# Patient Record
Sex: MALE | Race: WHITE | Employment: OTHER | URBAN - NONMETROPOLITAN AREA
[De-identification: names, ages, dates, MRNs, and addresses within clinical notes are randomized per-mention and may not be internally consistent; named-entity substitution may affect disease eponyms.]

---

## 2018-02-01 ENCOUNTER — TELEPHONE (OUTPATIENT)
Dept: FAMILY MEDICINE CLINIC | Age: 58
End: 2018-02-01

## 2018-02-01 DIAGNOSIS — E78.5 HYPERLIPIDEMIA, UNSPECIFIED HYPERLIPIDEMIA TYPE: Primary | ICD-10-CM

## 2018-02-01 NOTE — TELEPHONE ENCOUNTER
Patients wife called back and asked if you would order the labs requested by the eye  To be done before they schedule an appointment with you to review them.

## 2018-02-12 LAB
ALBUMIN SERPL-MCNC: NORMAL G/DL
ALBUMIN SERPL-MCNC: NORMAL G/DL
ALP BLD-CCNC: NORMAL U/L
ALP BLD-CCNC: NORMAL U/L
ALT SERPL-CCNC: NORMAL U/L
ALT SERPL-CCNC: NORMAL U/L
ANION GAP SERPL CALCULATED.3IONS-SCNC: NORMAL MMOL/L
ANION GAP SERPL CALCULATED.3IONS-SCNC: NORMAL MMOL/L
AST SERPL-CCNC: NORMAL U/L
AST SERPL-CCNC: NORMAL U/L
BILIRUB SERPL-MCNC: NORMAL MG/DL (ref 0.1–1.4)
BILIRUB SERPL-MCNC: NORMAL MG/DL (ref 0.1–1.4)
BUN BLDV-MCNC: NORMAL MG/DL
BUN BLDV-MCNC: NORMAL MG/DL
CALCIUM SERPL-MCNC: 9.4 MG/DL
CALCIUM SERPL-MCNC: NORMAL MG/DL
CHLORIDE BLD-SCNC: NORMAL MMOL/L
CHLORIDE BLD-SCNC: NORMAL MMOL/L
CHOLESTEROL, TOTAL: 221 MG/DL
CHOLESTEROL, TOTAL: 221 MG/DL
CHOLESTEROL/HDL RATIO: NORMAL
CHOLESTEROL/HDL RATIO: NORMAL
CO2: NORMAL MMOL/L
CO2: NORMAL MMOL/L
CREAT SERPL-MCNC: 0.82 MG/DL
CREAT SERPL-MCNC: 0.82 MG/DL
GFR CALCULATED: NORMAL
GFR CALCULATED: NORMAL
GLUCOSE BLD-MCNC: 100 MG/DL
GLUCOSE BLD-MCNC: NORMAL MG/DL
HDLC SERPL-MCNC: 53 MG/DL (ref 35–70)
HDLC SERPL-MCNC: 53 MG/DL (ref 35–70)
LDL CHOLESTEROL CALCULATED: 159 MG/DL (ref 0–160)
LDL CHOLESTEROL CALCULATED: 159 MG/DL (ref 0–160)
POTASSIUM SERPL-SCNC: 5.2 MMOL/L
POTASSIUM SERPL-SCNC: 5.2 MMOL/L
SODIUM BLD-SCNC: 141 MMOL/L
SODIUM BLD-SCNC: NORMAL MMOL/L
TOTAL PROTEIN: NORMAL
TOTAL PROTEIN: NORMAL
TRIGL SERPL-MCNC: 45 MG/DL
TRIGL SERPL-MCNC: 45 MG/DL
VLDLC SERPL CALC-MCNC: 9 MG/DL
VLDLC SERPL CALC-MCNC: 9 MG/DL

## 2018-02-13 ENCOUNTER — OFFICE VISIT (OUTPATIENT)
Dept: FAMILY MEDICINE CLINIC | Age: 58
End: 2018-02-13
Payer: COMMERCIAL

## 2018-02-13 VITALS
BODY MASS INDEX: 33.21 KG/M2 | DIASTOLIC BLOOD PRESSURE: 78 MMHG | HEIGHT: 67 IN | RESPIRATION RATE: 18 BRPM | SYSTOLIC BLOOD PRESSURE: 130 MMHG | WEIGHT: 211.6 LBS | OXYGEN SATURATION: 97 % | HEART RATE: 68 BPM

## 2018-02-13 DIAGNOSIS — E78.00 PURE HYPERCHOLESTEROLEMIA: ICD-10-CM

## 2018-02-13 DIAGNOSIS — Z23 NEED FOR PNEUMOCOCCAL VACCINATION: ICD-10-CM

## 2018-02-13 DIAGNOSIS — H34.219 HOLLENHORST PLAQUE: Primary | ICD-10-CM

## 2018-02-13 PROCEDURE — 99214 OFFICE O/P EST MOD 30 MIN: CPT | Performed by: NURSE PRACTITIONER

## 2018-02-13 PROCEDURE — 90732 PPSV23 VACC 2 YRS+ SUBQ/IM: CPT | Performed by: NURSE PRACTITIONER

## 2018-02-13 PROCEDURE — G8484 FLU IMMUNIZE NO ADMIN: HCPCS | Performed by: NURSE PRACTITIONER

## 2018-02-13 PROCEDURE — 90471 IMMUNIZATION ADMIN: CPT | Performed by: NURSE PRACTITIONER

## 2018-02-13 PROCEDURE — 4004F PT TOBACCO SCREEN RCVD TLK: CPT | Performed by: NURSE PRACTITIONER

## 2018-02-13 PROCEDURE — 3017F COLORECTAL CA SCREEN DOC REV: CPT | Performed by: NURSE PRACTITIONER

## 2018-02-13 PROCEDURE — G8417 CALC BMI ABV UP PARAM F/U: HCPCS | Performed by: NURSE PRACTITIONER

## 2018-02-13 PROCEDURE — G8427 DOCREV CUR MEDS BY ELIG CLIN: HCPCS | Performed by: NURSE PRACTITIONER

## 2018-02-13 RX ORDER — BUPROPION HYDROCHLORIDE 150 MG/1
150 TABLET ORAL EVERY MORNING
Qty: 30 TABLET | Refills: 3 | Status: SHIPPED | OUTPATIENT
Start: 2018-02-13 | End: 2022-08-16

## 2018-02-13 RX ORDER — ATORVASTATIN CALCIUM 10 MG/1
10 TABLET, FILM COATED ORAL DAILY
Qty: 90 TABLET | Refills: 3 | Status: SHIPPED | OUTPATIENT
Start: 2018-02-13 | End: 2022-04-12

## 2018-02-13 ASSESSMENT — ENCOUNTER SYMPTOMS
GASTROINTESTINAL NEGATIVE: 1
RESPIRATORY NEGATIVE: 1
COLOR CHANGE: 1
EYES NEGATIVE: 1

## 2018-02-13 NOTE — PROGRESS NOTES
Subjective:      Patient ID: Joshua Morgan is a 62 y.o. male. HPI   Chief Complaint   Patient presents with   [address] Other     spot on outside of left foot     Patient's medications, allergies, past medical, surgical, social and family histories were reviewed and updated as appropriate. There is no problem list on file for this patient. Allergies   Allergen Reactions    Penicillins Shortness Of Breath, Swelling and Rash    Statins      Health Maintenance   Topic Date Due    Hepatitis C screen  1960    HIV screen  10/28/1975    DTaP/Tdap/Td vaccine (1 - Tdap) 10/28/1979    Diabetes screen  10/28/2000    Flu vaccine (1) 09/01/2017    Lipid screen  07/15/2021    Colon cancer screen colonoscopy  07/13/2025    Pneumococcal med risk  Completed     Current Outpatient Prescriptions   Medication Sig Dispense Refill    atorvastatin (LIPITOR) 10 MG tablet Take 1 tablet by mouth daily 90 tablet 3    buPROPion (WELLBUTRIN XL) 150 MG extended release tablet Take 1 tablet by mouth every morning 30 tablet 3    UNABLE TO FIND Indications: LipiControl       No current facility-administered medications for this visit. Pt here for check. Reviewed recent note from optometry about his cholesterol  Pt has previouis tried lipitor and said it made him achy but states will try again. He did have a traumatic injury to eye before which may explain the finding. He laos has a lesion on his nose he wants checked and his foot     Review of Systems   Constitutional: Negative. HENT: Negative. Eyes: Negative. Respiratory: Negative. Cardiovascular: Negative. Gastrointestinal: Negative. Musculoskeletal: Negative. Skin: Positive for color change. Neurological: Negative. Objective:   Physical Exam   Constitutional: He is oriented to person, place, and time. He appears well-developed and well-nourished. HENT:   Head: Normocephalic.        Right Ear: Tympanic

## 2022-03-29 ENCOUNTER — OFFICE VISIT (OUTPATIENT)
Dept: FAMILY MEDICINE CLINIC | Age: 62
End: 2022-03-29
Payer: COMMERCIAL

## 2022-03-29 VITALS
DIASTOLIC BLOOD PRESSURE: 89 MMHG | RESPIRATION RATE: 17 BRPM | HEIGHT: 67 IN | TEMPERATURE: 96.9 F | BODY MASS INDEX: 34.53 KG/M2 | SYSTOLIC BLOOD PRESSURE: 152 MMHG | OXYGEN SATURATION: 94 % | HEART RATE: 74 BPM | WEIGHT: 220 LBS

## 2022-03-29 DIAGNOSIS — L03.811 CELLULITIS OF HEAD OR SCALP: ICD-10-CM

## 2022-03-29 DIAGNOSIS — Z12.5 SCREENING FOR PROSTATE CANCER: ICD-10-CM

## 2022-03-29 DIAGNOSIS — E78.5 HYPERLIPIDEMIA, UNSPECIFIED HYPERLIPIDEMIA TYPE: ICD-10-CM

## 2022-03-29 DIAGNOSIS — R03.0 ELEVATED BP WITHOUT DIAGNOSIS OF HYPERTENSION: Primary | ICD-10-CM

## 2022-03-29 PROCEDURE — 3017F COLORECTAL CA SCREEN DOC REV: CPT | Performed by: NURSE PRACTITIONER

## 2022-03-29 PROCEDURE — G8484 FLU IMMUNIZE NO ADMIN: HCPCS | Performed by: NURSE PRACTITIONER

## 2022-03-29 PROCEDURE — 1036F TOBACCO NON-USER: CPT | Performed by: NURSE PRACTITIONER

## 2022-03-29 PROCEDURE — G8419 CALC BMI OUT NRM PARAM NOF/U: HCPCS | Performed by: NURSE PRACTITIONER

## 2022-03-29 PROCEDURE — 99214 OFFICE O/P EST MOD 30 MIN: CPT | Performed by: NURSE PRACTITIONER

## 2022-03-29 PROCEDURE — G8427 DOCREV CUR MEDS BY ELIG CLIN: HCPCS | Performed by: NURSE PRACTITIONER

## 2022-03-29 RX ORDER — DOXYCYCLINE HYCLATE 100 MG
100 TABLET ORAL 2 TIMES DAILY
Qty: 28 TABLET | Refills: 0 | Status: SHIPPED | OUTPATIENT
Start: 2022-03-29 | End: 2022-04-12

## 2022-03-29 RX ORDER — KETOCONAZOLE 20 MG/G
CREAM TOPICAL
Qty: 30 G | Refills: 1 | Status: SHIPPED | OUTPATIENT
Start: 2022-03-29

## 2022-03-29 SDOH — ECONOMIC STABILITY: FOOD INSECURITY: WITHIN THE PAST 12 MONTHS, THE FOOD YOU BOUGHT JUST DIDN'T LAST AND YOU DIDN'T HAVE MONEY TO GET MORE.: NEVER TRUE

## 2022-03-29 SDOH — ECONOMIC STABILITY: FOOD INSECURITY: WITHIN THE PAST 12 MONTHS, YOU WORRIED THAT YOUR FOOD WOULD RUN OUT BEFORE YOU GOT MONEY TO BUY MORE.: NEVER TRUE

## 2022-03-29 ASSESSMENT — PATIENT HEALTH QUESTIONNAIRE - PHQ9
SUM OF ALL RESPONSES TO PHQ QUESTIONS 1-9: 0
SUM OF ALL RESPONSES TO PHQ QUESTIONS 1-9: 0
2. FEELING DOWN, DEPRESSED OR HOPELESS: 0
SUM OF ALL RESPONSES TO PHQ9 QUESTIONS 1 & 2: 0
1. LITTLE INTEREST OR PLEASURE IN DOING THINGS: 0
SUM OF ALL RESPONSES TO PHQ QUESTIONS 1-9: 0
SUM OF ALL RESPONSES TO PHQ QUESTIONS 1-9: 0

## 2022-03-29 ASSESSMENT — ENCOUNTER SYMPTOMS
GASTROINTESTINAL NEGATIVE: 1
RESPIRATORY NEGATIVE: 1
EYES NEGATIVE: 1

## 2022-03-29 ASSESSMENT — SOCIAL DETERMINANTS OF HEALTH (SDOH): HOW HARD IS IT FOR YOU TO PAY FOR THE VERY BASICS LIKE FOOD, HOUSING, MEDICAL CARE, AND HEATING?: NOT HARD AT ALL

## 2022-03-29 NOTE — PROGRESS NOTES
Adriana Milner is a 64 y.o. male whopresents today for :  Chief Complaint   Patient presents with    Rash     on forehead spreading        HPI:     HPI  Started 3 weeks ago with a \"boil\" on forehead, which has then has slowly spread in the area. There is no problem list on file for this patient. Past Medical History:   Diagnosis Date    Hearing loss       Past Surgical History:   Procedure Laterality Date    HERNIA REPAIR      TONSILLECTOMY Bilateral      Family History   Problem Relation Age of Onset    High Blood Pressure Mother     Cancer Brother     Heart Disease Maternal Grandmother     Other Maternal Grandfather         blood clot     Social History     Tobacco Use    Smoking status: Former Smoker     Packs/day: 1.00     Years: 10.00     Pack years: 10.00     Types: Cigars, Cigarettes     Quit date: 2007     Years since quitting: 15.2    Smokeless tobacco: Never Used   Substance Use Topics    Alcohol use: Yes     Alcohol/week: 12.0 standard drinks     Types: 12 Cans of beer per week      Current Outpatient Medications   Medication Sig Dispense Refill    doxycycline hyclate (VIBRA-TABS) 100 MG tablet Take 1 tablet by mouth 2 times daily for 14 days 28 tablet 0    ketoconazole (NIZORAL) 2 % cream Apply topically daily. 30 g 1    atorvastatin (LIPITOR) 10 MG tablet Take 1 tablet by mouth daily (Patient not taking: Reported on 3/29/2022) 90 tablet 3    buPROPion (WELLBUTRIN XL) 150 MG extended release tablet Take 1 tablet by mouth every morning (Patient not taking: Reported on 3/29/2022) 30 tablet 3    UNABLE TO FIND Indications: LipiControl (Patient not taking: Reported on 3/29/2022)       No current facility-administered medications for this visit.      Allergies   Allergen Reactions    Penicillins Shortness Of Breath, Swelling and Rash    Statins      Health Maintenance   Topic Date Due    COVID-19 Vaccine (1) Never done    DTaP/Tdap/Td vaccine (1 - Tdap) Never done    Diabetes screen  Never done    Shingles Vaccine (1 of 2) Never done    Lipid screen  02/12/2019    Flu vaccine (1) Never done    Depression Screen  03/29/2023    Colorectal Cancer Screen  07/13/2025    Hepatitis A vaccine  Aged Out    Hepatitis B vaccine  Aged Out    Hib vaccine  Aged Out    Meningococcal (ACWY) vaccine  Aged Out    Pneumococcal 0-64 years Vaccine  Aged Out    Hepatitis C screen  Discontinued    HIV screen  Discontinued       Subjective:     Review of Systems   Constitutional: Negative. HENT: Negative. Eyes: Negative. Respiratory: Negative. Cardiovascular: Negative. Gastrointestinal: Negative. Musculoskeletal: Negative. Skin: Positive for wound. Neurological: Negative. Objective:     Vitals:    03/29/22 0950   BP: (!) 152/89   Site: Left Upper Arm   Position: Sitting   Cuff Size: Large Adult   Pulse: 74   Resp: 17   Temp: 96.9 °F (36.1 °C)   TempSrc: Temporal   SpO2: 94%   Weight: 220 lb (99.8 kg)   Height: 5' 7\" (1.702 m)       Physical Exam  Constitutional:       Appearance: He is well-developed. HENT:      Head: Normocephalic. Right Ear: Tympanic membrane and external ear normal.      Left Ear: Tympanic membrane and external ear normal.      Nose: Nose normal.   Cardiovascular:      Rate and Rhythm: Normal rate and regular rhythm. Heart sounds: Normal heart sounds. No murmur heard. No friction rub. No gallop. Pulmonary:      Effort: Pulmonary effort is normal.      Breath sounds: Normal breath sounds. No wheezing or rales. Abdominal:      General: Bowel sounds are normal.      Palpations: Abdomen is soft. Tenderness: There is no abdominal tenderness. There is no guarding. Musculoskeletal:         General: Normal range of motion. Cervical back: Normal range of motion and neck supple. Lymphadenopathy:      Cervical: No cervical adenopathy. Skin:     General: Skin is warm.    Neurological:      Mental Status: He is alert and oriented to person, place, and time. Deep Tendon Reflexes: Reflexes are normal and symmetric. Assessment:      Diagnosis Orders   1. Elevated BP without diagnosis of hypertension  CBC with Auto Differential    Comprehensive Metabolic Panel    Lipid Panel   2. Cellulitis of head or scalp     3. Screening for prostate cancer  PSA Screening   4. Hyperlipidemia, unspecified hyperlipidemia type  CBC with Auto Differential    Comprehensive Metabolic Panel    Lipid Panel       Plan:      No follow-ups on file. Orders Placed This Encounter   Procedures    CBC with Auto Differential     Standing Status:   Future     Standing Expiration Date:   3/29/2023    Comprehensive Metabolic Panel     Standing Status:   Future     Standing Expiration Date:   3/29/2023    Lipid Panel     Standing Status:   Future     Standing Expiration Date:   3/29/2023     Order Specific Question:   Is Patient Fasting?/# of Hours     Answer:   15    PSA Screening     Standing Status:   Future     Standing Expiration Date:   3/29/2023     Orders Placed This Encounter   Medications    doxycycline hyclate (VIBRA-TABS) 100 MG tablet     Sig: Take 1 tablet by mouth 2 times daily for 14 days     Dispense:  28 tablet     Refill:  0    ketoconazole (NIZORAL) 2 % cream     Sig: Apply topically daily. Dispense:  30 g     Refill:  1      See orders  bp was elevated  Advised to monitor  Get labs done  Discussed vaccination    Patient given educational materials - seepatient instructions. Discussed use, benefit, and side effects of prescribed medications. All patient questions answered. Pt voiced understanding. Patient agreed withtreatment plan. Follow up as directed.      Electronically signed by VEENA Elizabeth CNP on 3/29/2022 at 4:21 PM

## 2022-03-30 ENCOUNTER — NURSE TRIAGE (OUTPATIENT)
Dept: OTHER | Facility: CLINIC | Age: 62
End: 2022-03-30

## 2022-03-30 ENCOUNTER — TELEPHONE (OUTPATIENT)
Dept: FAMILY MEDICINE CLINIC | Age: 62
End: 2022-03-30

## 2022-03-30 RX ORDER — LISINOPRIL AND HYDROCHLOROTHIAZIDE 12.5; 1 MG/1; MG/1
1 TABLET ORAL DAILY
Qty: 90 TABLET | Refills: 1 | Status: SHIPPED | OUTPATIENT
Start: 2022-03-30 | End: 2022-08-16

## 2022-03-30 NOTE — TELEPHONE ENCOUNTER
Pt and wife called back stating pt went to [facility] to check bp. Pt checked BP x 3 and was 219/111, 213/120 and 222/120.     Pt will start Prinzide, continue to monitor BP's at home and will call the office back if further concerns

## 2022-03-30 NOTE — TELEPHONE ENCOUNTER
Call tomorrow with how it is doing. I am hoping the reading today was an isolated event as it was high yesterday but nowhere near this high. If we get to readings like yesterday ok to drive.   With current readings we should be cautious

## 2022-03-30 NOTE — TELEPHONE ENCOUNTER
Pt's wife Darnell Vigil called concerned with pt's BP this am. Wife stated when pt was here yesterday for appt it was slightly elevated but did not feel concerned. Pt checked BP three times this morning. Reading's were 191/105, 201/111 and 212/111. Pt told wife he was not having any symptoms and it was probably just the machine. Wife checked her BP to see if it would read high and her BP reading was 122/85. Wife instructed pt to stop by [facility] or [facility] on way to work and check BP with one of the machines at the pharmacy. Advised wife I would send msg on to provider but if pt is having any BP readings that high with chest pain or any symptoms to report to ER.

## 2022-03-30 NOTE — TELEPHONE ENCOUNTER
Received call from [address] at [facility] with Red Flag Complaint. Subjective: Caller states \"we were think pressure cuff at home isn't accurate. He went to [facility] 222/120 and few minutes after rest 219/111, 213/120\"     Current Symptoms: pt reports feels fine. Patient denies chest pain , shortness of breath, difficulty talking or walking, blurry vision, dizziness, headache.    - this writer did see notes about BP with office and plan that provider sent in [facility]. Pt is not aware of this. Onset: today    Associated Symptoms: NA    Pain Severity: no pain     Temperature: no fever    What has been tried: rechecking blood [facility] at [facility]. LMP: NA Pregnant: NA    Recommended disposition: Go to [facility]    Care advice provided, patient verbalizes understanding; denies any other questions or concerns; instructed to call back for any new or worsening symptoms. Patient/Caller agrees with recommended disposition; writer provided warm transfer to channing at [facility] for appointment scheduling     Attention Provider: Thank you for allowing me to participate in the care of your patient. The patient was connected to triage in response to information provided to the ECC/PSC. Please do not respond through this encounter as the response is not directed to a shared pool.     Reason for Disposition   Systolic BP >= 787 OR Diastolic >= 640 and having NO cardiac or neurologic symptoms    Protocols used: BLOOD PRESSURE - HIGH-ADULT-OH

## 2022-03-30 NOTE — TELEPHONE ENCOUNTER
Spoke with patient's wife. Patient scheduled for appt 4/12/22. Patient's wife asking if it is ok for patient to be driving wit his BP running this high.   Patient is asymptomatic

## 2022-03-31 NOTE — TELEPHONE ENCOUNTER
Wife called back with BP readings:    3/30/22: evening 183/107. Wife states she waiting about 2 hours after patient took BP medication to recheck. BP at 10:30pm 164/88    3/31/22: AM BP: 168/97 then 168/97 again at 8:30am.  Patient had not taken BP medication.   Rechecked blood pressure after taking medication 162/96

## 2022-03-31 NOTE — TELEPHONE ENCOUNTER
Spoke with pt, he states he is feeling fine and his BP last evening was 180/88 and this am is was even lower.   He is not at home and his wife has the written down at home    Spoke with wife Edward Menchaca, she is not home she will call back this afternoon with BP readings

## 2022-04-11 ENCOUNTER — NURSE ONLY (OUTPATIENT)
Dept: FAMILY MEDICINE CLINIC | Age: 62
End: 2022-04-11
Payer: COMMERCIAL

## 2022-04-11 DIAGNOSIS — E78.5 HYPERLIPIDEMIA, UNSPECIFIED HYPERLIPIDEMIA TYPE: ICD-10-CM

## 2022-04-11 DIAGNOSIS — Z12.5 SCREENING FOR PROSTATE CANCER: ICD-10-CM

## 2022-04-11 DIAGNOSIS — R03.0 ELEVATED BP WITHOUT DIAGNOSIS OF HYPERTENSION: ICD-10-CM

## 2022-04-11 PROCEDURE — 36415 COLL VENOUS BLD VENIPUNCTURE: CPT | Performed by: NURSE PRACTITIONER

## 2022-04-12 ENCOUNTER — OFFICE VISIT (OUTPATIENT)
Dept: FAMILY MEDICINE CLINIC | Age: 62
End: 2022-04-12
Payer: COMMERCIAL

## 2022-04-12 VITALS
HEART RATE: 67 BPM | RESPIRATION RATE: 16 BRPM | SYSTOLIC BLOOD PRESSURE: 152 MMHG | OXYGEN SATURATION: 98 % | WEIGHT: 216 LBS | TEMPERATURE: 98.6 F | HEIGHT: 67 IN | BODY MASS INDEX: 33.9 KG/M2 | DIASTOLIC BLOOD PRESSURE: 82 MMHG

## 2022-04-12 DIAGNOSIS — E78.5 HYPERLIPIDEMIA, UNSPECIFIED HYPERLIPIDEMIA TYPE: Primary | ICD-10-CM

## 2022-04-12 DIAGNOSIS — I10 HYPERTENSION, UNSPECIFIED TYPE: ICD-10-CM

## 2022-04-12 LAB
ALBUMIN SERPL-MCNC: 4.2 G/DL (ref 3.5–5.1)
ALP BLD-CCNC: 76 U/L (ref 38–126)
ALT SERPL-CCNC: 19 U/L (ref 11–66)
ANION GAP SERPL CALCULATED.3IONS-SCNC: 10 MEQ/L (ref 8–16)
AST SERPL-CCNC: 20 U/L (ref 5–40)
BASOPHILS # BLD: 0.7 %
BASOPHILS ABSOLUTE: 0 THOU/MM3 (ref 0–0.1)
BILIRUB SERPL-MCNC: 0.3 MG/DL (ref 0.3–1.2)
BUN BLDV-MCNC: 17 MG/DL (ref 7–22)
CALCIUM SERPL-MCNC: 9.2 MG/DL (ref 8.5–10.5)
CHLORIDE BLD-SCNC: 105 MEQ/L (ref 98–111)
CHOLESTEROL, TOTAL: 232 MG/DL (ref 100–199)
CO2: 26 MEQ/L (ref 23–33)
CREAT SERPL-MCNC: 0.7 MG/DL (ref 0.4–1.2)
EOSINOPHIL # BLD: 2.5 %
EOSINOPHILS ABSOLUTE: 0.2 THOU/MM3 (ref 0–0.4)
ERYTHROCYTE [DISTWIDTH] IN BLOOD BY AUTOMATED COUNT: 12.1 % (ref 11.5–14.5)
ERYTHROCYTE [DISTWIDTH] IN BLOOD BY AUTOMATED COUNT: 45.9 FL (ref 35–45)
GFR SERPL CREATININE-BSD FRML MDRD: > 90 ML/MIN/1.73M2
GLUCOSE BLD-MCNC: 104 MG/DL (ref 70–108)
HCT VFR BLD CALC: 49.2 % (ref 42–52)
HDLC SERPL-MCNC: 51 MG/DL
HEMOGLOBIN: 16.1 GM/DL (ref 14–18)
IMMATURE GRANS (ABS): 0.01 THOU/MM3 (ref 0–0.07)
IMMATURE GRANULOCYTES: 0.2 %
LDL CHOLESTEROL CALCULATED: 160 MG/DL
LYMPHOCYTES # BLD: 36.1 %
LYMPHOCYTES ABSOLUTE: 2.2 THOU/MM3 (ref 1–4.8)
MCH RBC QN AUTO: 33.1 PG (ref 26–33)
MCHC RBC AUTO-ENTMCNC: 32.7 GM/DL (ref 32.2–35.5)
MCV RBC AUTO: 101.2 FL (ref 80–94)
MONOCYTES # BLD: 8.9 %
MONOCYTES ABSOLUTE: 0.5 THOU/MM3 (ref 0.4–1.3)
NUCLEATED RED BLOOD CELLS: 0 /100 WBC
PLATELET # BLD: 183 THOU/MM3 (ref 130–400)
PMV BLD AUTO: 11.1 FL (ref 9.4–12.4)
POTASSIUM SERPL-SCNC: 5.2 MEQ/L (ref 3.5–5.2)
PROSTATE SPECIFIC ANTIGEN: 0.27 NG/ML (ref 0–1)
RBC # BLD: 4.86 MILL/MM3 (ref 4.7–6.1)
SEG NEUTROPHILS: 51.6 %
SEGMENTED NEUTROPHILS ABSOLUTE COUNT: 3.1 THOU/MM3 (ref 1.8–7.7)
SODIUM BLD-SCNC: 141 MEQ/L (ref 135–145)
TOTAL PROTEIN: 7 G/DL (ref 6.1–8)
TRIGL SERPL-MCNC: 105 MG/DL (ref 0–199)
WBC # BLD: 6 THOU/MM3 (ref 4.8–10.8)

## 2022-04-12 PROCEDURE — 99214 OFFICE O/P EST MOD 30 MIN: CPT | Performed by: NURSE PRACTITIONER

## 2022-04-12 PROCEDURE — G8417 CALC BMI ABV UP PARAM F/U: HCPCS | Performed by: NURSE PRACTITIONER

## 2022-04-12 PROCEDURE — G8427 DOCREV CUR MEDS BY ELIG CLIN: HCPCS | Performed by: NURSE PRACTITIONER

## 2022-04-12 PROCEDURE — 1036F TOBACCO NON-USER: CPT | Performed by: NURSE PRACTITIONER

## 2022-04-12 PROCEDURE — 3017F COLORECTAL CA SCREEN DOC REV: CPT | Performed by: NURSE PRACTITIONER

## 2022-04-12 RX ORDER — LISINOPRIL AND HYDROCHLOROTHIAZIDE 25; 20 MG/1; MG/1
1 TABLET ORAL DAILY
Qty: 90 TABLET | Refills: 1 | Status: SHIPPED | OUTPATIENT
Start: 2022-04-12 | End: 2022-08-16 | Stop reason: SDUPTHER

## 2022-04-12 RX ORDER — PRAVASTATIN SODIUM 20 MG
20 TABLET ORAL DAILY
Qty: 30 TABLET | Refills: 5 | Status: SHIPPED | OUTPATIENT
Start: 2022-04-12 | End: 2022-08-16 | Stop reason: SDUPTHER

## 2022-04-12 ASSESSMENT — ENCOUNTER SYMPTOMS
RESPIRATORY NEGATIVE: 1
GASTROINTESTINAL NEGATIVE: 1
EYES NEGATIVE: 1

## 2022-04-12 NOTE — PROGRESS NOTES
Jackie Bass is a 64 y.o. male whopresents today for :  Chief Complaint   Patient presents with    2 Week Follow-Up     htn       HPI:     HPI  Pt here for fu. He reports he continues to feel good. bp is improved tolerating med well. Pt had labs done which showed high cholesterol  Pt did not do well with lipitor before but discussed trying again  The area on his forehead has scarred up. Looks like it was resolving shingles. He has no ongoing issues   There is no problem list on file for this patient. Past Medical History:   Diagnosis Date    Hearing loss       Past Surgical History:   Procedure Laterality Date    HERNIA REPAIR      TONSILLECTOMY Bilateral      Family History   Problem Relation Age of Onset    High Blood Pressure Mother     Cancer Brother     Heart Disease Maternal Grandmother     Other Maternal Grandfather         blood clot     Social History     Tobacco Use    Smoking status: Former Smoker     Packs/day: 1.00     Years: 10.00     Pack years: 10.00     Types: Cigars, Cigarettes     Quit date: 2007     Years since quitting: 15.2    Smokeless tobacco: Never Used   Substance Use Topics    Alcohol use: Yes     Alcohol/week: 12.0 standard drinks     Types: 12 Cans of beer per week      Current Outpatient Medications   Medication Sig Dispense Refill    lisinopril-hydroCHLOROthiazide (PRINZIDE;ZESTORETIC) 20-25 MG per tablet Take 1 tablet by mouth daily 90 tablet 1    pravastatin (PRAVACHOL) 20 MG tablet Take 1 tablet by mouth daily 30 tablet 5    lisinopril-hydroCHLOROthiazide (PRINZIDE;ZESTORETIC) 10-12.5 MG per tablet Take 1 tablet by mouth daily 90 tablet 1    ketoconazole (NIZORAL) 2 % cream Apply topically daily.  30 g 1    buPROPion (WELLBUTRIN XL) 150 MG extended release tablet Take 1 tablet by mouth every morning (Patient not taking: Reported on 3/29/2022) 30 tablet 3    UNABLE TO FIND Indications: LipiControl (Patient not taking: Reported on 3/29/2022)       No current facility-administered medications for this visit. Allergies   Allergen Reactions    Penicillins Shortness Of Breath, Swelling and Rash    Statins      Health Maintenance   Topic Date Due    Diabetes screen  Never done    COVID-19 Vaccine (1) 04/07/2023 (Originally 10/28/1965)    DTaP/Tdap/Td vaccine (1 - Tdap) 04/12/2023 (Originally 10/28/1979)    Shingles Vaccine (1 of 2) 04/12/2023 (Originally 10/28/2010)    Flu vaccine (Season Ended) 09/01/2022    Depression Screen  03/29/2023    Lipid screen  04/11/2023    Potassium monitoring  04/11/2023    Creatinine monitoring  04/11/2023    Colorectal Cancer Screen  07/13/2025    Hepatitis A vaccine  Aged Out    Hepatitis B vaccine  Aged Out    Hib vaccine  Aged Out    Meningococcal (ACWY) vaccine  Aged Out    Pneumococcal 0-64 years Vaccine  Aged Out    Hepatitis C screen  Discontinued    HIV screen  Discontinued       Subjective:     Review of Systems   Constitutional: Negative. HENT: Negative. Eyes: Negative. Respiratory: Negative. Cardiovascular: Negative. Gastrointestinal: Negative. Musculoskeletal: Negative. Skin: Negative. Neurological: Negative. Objective:     Vitals:    04/12/22 1037   BP: (!) 152/82   Site: Left Upper Arm   Position: Sitting   Cuff Size: Large Adult   Pulse: 67   Resp: 16   Temp: 98.6 °F (37 °C)   TempSrc: Temporal   SpO2: 98%   Weight: 216 lb (98 kg)   Height: 5' 7.01\" (1.702 m)       Physical Exam  Constitutional:       Appearance: He is well-developed. HENT:      Head: Normocephalic. Right Ear: Tympanic membrane and external ear normal.      Left Ear: Tympanic membrane and external ear normal.      Nose: Nose normal.   Cardiovascular:      Rate and Rhythm: Normal rate and regular rhythm. Heart sounds: Normal heart sounds. No murmur heard. No friction rub. No gallop. Pulmonary:      Effort: Pulmonary effort is normal.      Breath sounds: Normal breath sounds.  No wheezing or rales. Abdominal:      General: Bowel sounds are normal.      Palpations: Abdomen is soft. Tenderness: There is no abdominal tenderness. There is no guarding. Musculoskeletal:         General: Normal range of motion. Cervical back: Normal range of motion and neck supple. Lymphadenopathy:      Cervical: No cervical adenopathy. Skin:     General: Skin is warm. Neurological:      Mental Status: He is alert and oriented to person, place, and time. Deep Tendon Reflexes: Reflexes are normal and symmetric. Assessment:      Diagnosis Orders   1. Hyperlipidemia, unspecified hyperlipidemia type  pravastatin (PRAVACHOL) 20 MG tablet    Lipid Panel    AST    ALT    CK   2. Hypertension, unspecified type  lisinopril-hydroCHLOROthiazide (PRINZIDE;ZESTORETIC) 20-25 MG per tablet       Plan:      Return in about 4 months (around 8/12/2022). Orders Placed This Encounter   Procedures    Lipid Panel     Standing Status:   Future     Standing Expiration Date:   4/12/2023     Order Specific Question:   Is Patient Fasting?/# of Hours     Answer:   12    AST     Standing Status:   Future     Standing Expiration Date:   4/12/2023    ALT     Standing Status:   Future     Standing Expiration Date:   4/12/2023    CK     Standing Status:   Future     Standing Expiration Date:   4/12/2023     Orders Placed This Encounter   Medications    lisinopril-hydroCHLOROthiazide (PRINZIDE;ZESTORETIC) 20-25 MG per tablet     Sig: Take 1 tablet by mouth daily     Dispense:  90 tablet     Refill:  1    pravastatin (PRAVACHOL) 20 MG tablet     Sig: Take 1 tablet by mouth daily     Dispense:  30 tablet     Refill:  5      Increase prinzide to 20/25  Add pravachol  Labs in 2months  Fu in 4months    Patient given educational materials - seepatient instructions. Discussed use, benefit, and side effects of prescribed medications. All patient questions answered. Pt voiced understanding.   Patient agreed withtreatment plan. Follow up as directed.      Electronically signed by Ofelia Sacks, APRN - CNP on 4/12/2022 at 5:04 PM

## 2022-08-12 ENCOUNTER — NURSE ONLY (OUTPATIENT)
Dept: FAMILY MEDICINE CLINIC | Age: 62
End: 2022-08-12
Payer: COMMERCIAL

## 2022-08-12 DIAGNOSIS — E78.5 HYPERLIPIDEMIA, UNSPECIFIED HYPERLIPIDEMIA TYPE: ICD-10-CM

## 2022-08-12 LAB
ALT SERPL-CCNC: 19 U/L (ref 11–66)
AST SERPL-CCNC: 20 U/L (ref 5–40)
CHOLESTEROL, TOTAL: 210 MG/DL (ref 100–199)
HDLC SERPL-MCNC: 53 MG/DL
LDL CHOLESTEROL CALCULATED: 139 MG/DL
TOTAL CK: 82 U/L (ref 55–170)
TRIGL SERPL-MCNC: 89 MG/DL (ref 0–199)

## 2022-08-12 PROCEDURE — 36415 COLL VENOUS BLD VENIPUNCTURE: CPT | Performed by: FAMILY MEDICINE

## 2022-08-16 ENCOUNTER — OFFICE VISIT (OUTPATIENT)
Dept: FAMILY MEDICINE CLINIC | Age: 62
End: 2022-08-16
Payer: COMMERCIAL

## 2022-08-16 VITALS
TEMPERATURE: 98.4 F | HEIGHT: 67 IN | RESPIRATION RATE: 16 BRPM | DIASTOLIC BLOOD PRESSURE: 84 MMHG | OXYGEN SATURATION: 97 % | SYSTOLIC BLOOD PRESSURE: 136 MMHG | WEIGHT: 209 LBS | BODY MASS INDEX: 32.8 KG/M2 | HEART RATE: 72 BPM

## 2022-08-16 DIAGNOSIS — E78.5 HYPERLIPIDEMIA, UNSPECIFIED HYPERLIPIDEMIA TYPE: ICD-10-CM

## 2022-08-16 DIAGNOSIS — I10 HYPERTENSION, UNSPECIFIED TYPE: ICD-10-CM

## 2022-08-16 PROCEDURE — 99214 OFFICE O/P EST MOD 30 MIN: CPT | Performed by: NURSE PRACTITIONER

## 2022-08-16 PROCEDURE — G8417 CALC BMI ABV UP PARAM F/U: HCPCS | Performed by: NURSE PRACTITIONER

## 2022-08-16 PROCEDURE — G8427 DOCREV CUR MEDS BY ELIG CLIN: HCPCS | Performed by: NURSE PRACTITIONER

## 2022-08-16 PROCEDURE — 3017F COLORECTAL CA SCREEN DOC REV: CPT | Performed by: NURSE PRACTITIONER

## 2022-08-16 PROCEDURE — 1036F TOBACCO NON-USER: CPT | Performed by: NURSE PRACTITIONER

## 2022-08-16 RX ORDER — PRAVASTATIN SODIUM 20 MG
20 TABLET ORAL DAILY
Qty: 30 TABLET | Refills: 5 | Status: SHIPPED | OUTPATIENT
Start: 2022-08-16

## 2022-08-16 RX ORDER — LISINOPRIL AND HYDROCHLOROTHIAZIDE 25; 20 MG/1; MG/1
1 TABLET ORAL DAILY
Qty: 90 TABLET | Refills: 1 | Status: SHIPPED | OUTPATIENT
Start: 2022-08-16

## 2022-08-16 ASSESSMENT — ENCOUNTER SYMPTOMS
GASTROINTESTINAL NEGATIVE: 1
EYES NEGATIVE: 1
RESPIRATORY NEGATIVE: 1

## 2022-08-16 NOTE — PROGRESS NOTES
Mariah Medel is a 64 y.o. male whopresents today for :  Chief Complaint   Patient presents with    Follow-up     4 months- BP        HPI:     HPI  Pt here for fu. Bp is doing very well. Labs are good. Ldl is near goal.  He is tolerating pravachol well. There is no problem list on file for this patient. Past Medical History:   Diagnosis Date    Hearing loss       Past Surgical History:   Procedure Laterality Date    HERNIA REPAIR      TONSILLECTOMY Bilateral      Family History   Problem Relation Age of Onset    High Blood Pressure Mother     Cancer Brother     Heart Disease Maternal Grandmother     Other Maternal Grandfather         blood clot     Social History     Tobacco Use    Smoking status: Former     Packs/day: 1.00     Years: 10.00     Pack years: 10.00     Types: Cigars, Cigarettes     Quit date: 2007     Years since quitting: 15.6    Smokeless tobacco: Never   Substance Use Topics    Alcohol use: Yes     Alcohol/week: 12.0 standard drinks     Types: 12 Cans of beer per week      Current Outpatient Medications   Medication Sig Dispense Refill    lisinopril-hydroCHLOROthiazide (PRINZIDE;ZESTORETIC) 20-25 MG per tablet Take 1 tablet by mouth in the morning. 90 tablet 1    pravastatin (PRAVACHOL) 20 MG tablet Take 1 tablet by mouth in the morning. 30 tablet 5    ketoconazole (NIZORAL) 2 % cream Apply topically daily. 30 g 1     No current facility-administered medications for this visit.      Allergies   Allergen Reactions    Penicillins Shortness Of Breath, Swelling and Rash    Statins      Health Maintenance   Topic Date Due    Diabetes screen  Never done    COVID-19 Vaccine (1) 04/07/2023 (Originally 4/28/1961)    DTaP/Tdap/Td vaccine (1 - Tdap) 04/12/2023 (Originally 10/28/1979)    Shingles vaccine (1 of 2) 04/12/2023 (Originally 10/28/2010)    Flu vaccine (1) 09/01/2022    Depression Screen  03/29/2023    Lipids  08/12/2023    Colorectal Cancer Screen  07/13/2025    Hepatitis A vaccine Aged Out    Hepatitis B vaccine  Aged Out    Hib vaccine  Aged Out    Meningococcal (ACWY) vaccine  Aged Out    Pneumococcal 0-64 years Vaccine  Aged Out    Hepatitis C screen  Discontinued    HIV screen  Discontinued       Subjective:     Review of Systems   Constitutional: Negative. HENT: Negative. Eyes: Negative. Respiratory: Negative. Cardiovascular: Negative. Gastrointestinal: Negative. Musculoskeletal: Negative. Skin: Negative. Neurological: Negative. Objective:     Vitals:    08/16/22 0917   BP: 136/84   Site: Left Upper Arm   Position: Sitting   Cuff Size: Medium Adult   Pulse: 72   Resp: 16   Temp: 98.4 °F (36.9 °C)   TempSrc: Temporal   SpO2: 97%   Weight: 209 lb (94.8 kg)   Height: 5' 7.01\" (1.702 m)       Physical Exam  Constitutional:       Appearance: He is well-developed. HENT:      Head: Normocephalic. Right Ear: Tympanic membrane and external ear normal.      Left Ear: Tympanic membrane and external ear normal.      Nose: Nose normal.   Cardiovascular:      Rate and Rhythm: Normal rate and regular rhythm. Heart sounds: Normal heart sounds. No murmur heard. No friction rub. No gallop. Pulmonary:      Effort: Pulmonary effort is normal.      Breath sounds: Normal breath sounds. No wheezing or rales. Abdominal:      General: Bowel sounds are normal.      Palpations: Abdomen is soft. Tenderness: There is no abdominal tenderness. There is no guarding. Musculoskeletal:         General: Normal range of motion. Cervical back: Normal range of motion and neck supple. Lymphadenopathy:      Cervical: No cervical adenopathy. Skin:     General: Skin is warm. Neurological:      Mental Status: He is alert and oriented to person, place, and time. Deep Tendon Reflexes: Reflexes are normal and symmetric. Assessment:      Diagnosis Orders   1.  Hypertension, unspecified type  lisinopril-hydroCHLOROthiazide (PRINZIDE;ZESTORETIC) 20-25 MG per tablet      2. Hyperlipidemia, unspecified hyperlipidemia type  pravastatin (PRAVACHOL) 20 MG tablet          Plan:      Return in about 6 months (around 2/16/2023). No orders of the defined types were placed in this encounter. Orders Placed This Encounter   Medications    lisinopril-hydroCHLOROthiazide (PRINZIDE;ZESTORETIC) 20-25 MG per tablet     Sig: Take 1 tablet by mouth in the morning. Dispense:  90 tablet     Refill:  1    pravastatin (PRAVACHOL) 20 MG tablet     Sig: Take 1 tablet by mouth in the morning. Dispense:  30 tablet     Refill:  5      Cont meds  Recheck in 6months    Patient given educational materials - seepatient instructions. Discussed use, benefit, and side effects of prescribed medications. All patient questions answered. Pt voiced understanding. Patient agreed withtreatment plan. Follow up as directed.      Electronically signed by VEENA Domingo CNP on 8/16/2022 at 12:53 PM

## 2023-02-16 LAB
BILIRUBIN, URINE: NEGATIVE
BLOOD, URINE: NEGATIVE
CLARITY: CLEAR
COLOR: YELLOW
GLUCOSE URINE: NEGATIVE
KETONES, URINE: NEGATIVE
LEUKOCYTE ESTERASE, URINE: NEGATIVE
NITRITE, URINE: NEGATIVE
PH UA: 5.5 (ref 4.5–8)
PROTEIN UA: NEGATIVE
SPECIFIC GRAVITY, URINE: 1.01
UROBILINOGEN, URINE: NORMAL

## 2023-02-27 ENCOUNTER — OFFICE VISIT (OUTPATIENT)
Dept: FAMILY MEDICINE CLINIC | Age: 63
End: 2023-02-27
Payer: COMMERCIAL

## 2023-02-27 VITALS
WEIGHT: 213 LBS | HEIGHT: 67 IN | BODY MASS INDEX: 33.43 KG/M2 | DIASTOLIC BLOOD PRESSURE: 88 MMHG | SYSTOLIC BLOOD PRESSURE: 170 MMHG | HEART RATE: 85 BPM | TEMPERATURE: 97.5 F | RESPIRATION RATE: 16 BRPM | OXYGEN SATURATION: 97 %

## 2023-02-27 DIAGNOSIS — M25.561 ARTHRALGIA OF RIGHT KNEE: Primary | ICD-10-CM

## 2023-02-27 DIAGNOSIS — I10 HYPERTENSION, UNSPECIFIED TYPE: ICD-10-CM

## 2023-02-27 DIAGNOSIS — E78.5 HYPERLIPIDEMIA, UNSPECIFIED HYPERLIPIDEMIA TYPE: ICD-10-CM

## 2023-02-27 PROCEDURE — 99214 OFFICE O/P EST MOD 30 MIN: CPT | Performed by: NURSE PRACTITIONER

## 2023-02-27 PROCEDURE — G8417 CALC BMI ABV UP PARAM F/U: HCPCS | Performed by: NURSE PRACTITIONER

## 2023-02-27 PROCEDURE — 3079F DIAST BP 80-89 MM HG: CPT | Performed by: NURSE PRACTITIONER

## 2023-02-27 PROCEDURE — 1036F TOBACCO NON-USER: CPT | Performed by: NURSE PRACTITIONER

## 2023-02-27 PROCEDURE — G8484 FLU IMMUNIZE NO ADMIN: HCPCS | Performed by: NURSE PRACTITIONER

## 2023-02-27 PROCEDURE — 3017F COLORECTAL CA SCREEN DOC REV: CPT | Performed by: NURSE PRACTITIONER

## 2023-02-27 PROCEDURE — G8427 DOCREV CUR MEDS BY ELIG CLIN: HCPCS | Performed by: NURSE PRACTITIONER

## 2023-02-27 PROCEDURE — 3077F SYST BP >= 140 MM HG: CPT | Performed by: NURSE PRACTITIONER

## 2023-02-27 RX ORDER — LISINOPRIL AND HYDROCHLOROTHIAZIDE 25; 20 MG/1; MG/1
1 TABLET ORAL DAILY
Qty: 90 TABLET | Refills: 1 | Status: SHIPPED | OUTPATIENT
Start: 2023-02-27

## 2023-02-27 RX ORDER — AMLODIPINE BESYLATE 5 MG/1
5 TABLET ORAL DAILY
Qty: 30 TABLET | Refills: 3 | Status: SHIPPED | OUTPATIENT
Start: 2023-02-27

## 2023-02-27 RX ORDER — PRAVASTATIN SODIUM 20 MG
20 TABLET ORAL DAILY
Qty: 30 TABLET | Refills: 5 | Status: SHIPPED | OUTPATIENT
Start: 2023-02-27

## 2023-02-27 SDOH — ECONOMIC STABILITY: FOOD INSECURITY: WITHIN THE PAST 12 MONTHS, THE FOOD YOU BOUGHT JUST DIDN'T LAST AND YOU DIDN'T HAVE MONEY TO GET MORE.: NEVER TRUE

## 2023-02-27 SDOH — ECONOMIC STABILITY: FOOD INSECURITY: WITHIN THE PAST 12 MONTHS, YOU WORRIED THAT YOUR FOOD WOULD RUN OUT BEFORE YOU GOT MONEY TO BUY MORE.: NEVER TRUE

## 2023-02-27 SDOH — ECONOMIC STABILITY: HOUSING INSECURITY
IN THE LAST 12 MONTHS, WAS THERE A TIME WHEN YOU DID NOT HAVE A STEADY PLACE TO SLEEP OR SLEPT IN A SHELTER (INCLUDING NOW)?: NO

## 2023-02-27 SDOH — ECONOMIC STABILITY: INCOME INSECURITY: HOW HARD IS IT FOR YOU TO PAY FOR THE VERY BASICS LIKE FOOD, HOUSING, MEDICAL CARE, AND HEATING?: NOT HARD AT ALL

## 2023-02-27 ASSESSMENT — PATIENT HEALTH QUESTIONNAIRE - PHQ9
SUM OF ALL RESPONSES TO PHQ QUESTIONS 1-9: 0
SUM OF ALL RESPONSES TO PHQ9 QUESTIONS 1 & 2: 0
SUM OF ALL RESPONSES TO PHQ QUESTIONS 1-9: 0
1. LITTLE INTEREST OR PLEASURE IN DOING THINGS: 0
2. FEELING DOWN, DEPRESSED OR HOPELESS: 0

## 2023-02-27 NOTE — PROGRESS NOTES
Subjective:    Chief Complaint:     Alanis Obrien is a 58 y.o. male who presents for a preoperative physical examination. He is scheduled to have right total knee done by Dr. Gato Henderson at [facility] on 3/13. History of Present Illness:      Progressive knee pain    Past Medical History:   Diagnosis Date    Hearing loss         Review of patient's past surgical history indicates:     Past Surgical History:   Procedure Laterality Date    HERNIA REPAIR      TONSILLECTOMY Bilateral                                                    Current Outpatient Medications   Medication Sig Dispense Refill    lisinopril-hydroCHLOROthiazide (PRINZIDE;ZESTORETIC) 20-25 MG per tablet Take 1 tablet by mouth in the morning. 90 tablet 1    pravastatin (PRAVACHOL) 20 MG tablet Take 1 tablet by mouth in the morning. 30 tablet 5    ketoconazole (NIZORAL) 2 % cream Apply topically daily. 30 g 1     No current facility-administered medications for this visit. Allergies   Allergen Reactions    Penicillins Shortness Of Breath, Swelling and Rash    Statins        Social History     Tobacco Use    Smoking status: Former     Packs/day: 1.00     Years: 10.00     Pack years: 10.00     Types: Cigars, Cigarettes     Quit date:      Years since quittin.1    Smokeless tobacco: Never   Substance Use Topics    Alcohol use:  Yes     Alcohol/week: 12.0 standard drinks     Types: 12 Cans of beer per week    Drug use: No        Family History   Problem Relation Age of Onset    High Blood Pressure Mother     Cancer Brother     Heart Disease Maternal Grandmother     Other Maternal Grandfather         blood clot        Review Of Systems    Skin: no abnormal pigmentation, rash, scaling, itching, masses, hair or nail changes  Eyes: negative  Ears/Nose/Throat: negative  Respiratory: negative  Cardiovascular: negative  Gastrointestinal: negative  Genitourinary: negative  Musculoskeletal: negative  Neurologic: negative  Psychiatric: negative  Hematologic/Lymphatic/Immunologic: negative  Endocrine: negative       Objective:      BP (!) 170/88 (Site: Right Upper Arm, Position: Sitting, Cuff Size: Large Adult)   Pulse 85   Temp 97.5 °F (36.4 °C) (Temporal)   Resp 16   Ht 5' 7.01\" (1.702 m)   Wt 213 lb (96.6 kg)   SpO2 97%   BMI 33.35 kg/m²   General appearance - healthy, alert, no distress  Skin - Skin color, texture, turgor normal. No rashes or lesions. Head - Normocephalic. No masses, lesions, tenderness or abnormalities  Eyes - conjunctivae/corneas clear. PERRL, EOM's intact. Ears - External ears normal. Canals clear. TM's normal.  Nose/Sinuses - Nares normal. Septum midline. Mucosa normal. No drainage or sinus tenderness. Oropharynx - Lips, mucosa, and tongue normal. Teeth and gums normal. Orop  Neck - Neck supple. No adenopathy. Thyroid symmetric, normal size,  Back - Back symmetric, no curvature. ROM normal. No CVA tenderness. Lungs - Percussion normal. Good diaphragmatic excursion. Lungs clear  Heart - Regular rate and rhythm, with no rub, murmur or gallop noted. Abdomen - Abdomen soft, non-tender. BS normal. No masses, organomegaly  Extremities - Extremities normal. No deformities, edema, or skin discolora  Musculoskeletal - Spine ROM normal. Muscular strength intact. Peripheral pulses - radial=4/4,, femoral=4/4, popliteal=4/4, dorsalis pedis=4/4,  Neuro - Gait normal. Reflexes normal and symmetric. Sensation grossly normal.  No focal weakness    EKG: normal EKG, normal sinus rhythm, unchanged from previous tracings, Normal EKG, normal sinus rhythm. Assessment:       Diagnosis Orders   1. Arthralgia of right knee        2. Hyperlipidemia, unspecified hyperlipidemia type  pravastatin (PRAVACHOL) 20 MG tablet      3.  Hypertension, unspecified type  lisinopril-hydroCHLOROthiazide (PRINZIDE;ZESTORETIC) 20-25 MG per tablet    amLODIPine (NORVASC) 5 MG tablet          Per encounter diagnoses  He is medically cleared for surgery and anesthesia. Plan:        Did add norvasc for better bp control  Per operating surgeon. See also orders filed with this encounter, if any.

## 2023-07-05 DIAGNOSIS — I10 HYPERTENSION, UNSPECIFIED TYPE: ICD-10-CM

## 2023-07-05 RX ORDER — AMLODIPINE BESYLATE 5 MG/1
5 TABLET ORAL DAILY
Qty: 90 TABLET | Refills: 1 | Status: SHIPPED | OUTPATIENT
Start: 2023-07-05

## 2023-07-05 NOTE — TELEPHONE ENCOUNTER
Wife called in asking if patient needs to be taking the amlodipine still. He has completed his knee surgery and has less stress now. States last week the bp was in the 140's. This was all the info she could give writer at the time of the call. Advised to call back this afternoon with a reading from today to update pcp. Please advise.

## 2023-08-27 DIAGNOSIS — I10 HYPERTENSION, UNSPECIFIED TYPE: ICD-10-CM

## 2023-08-28 RX ORDER — LISINOPRIL AND HYDROCHLOROTHIAZIDE 25; 20 MG/1; MG/1
TABLET ORAL
Qty: 90 TABLET | Refills: 1 | Status: SHIPPED | OUTPATIENT
Start: 2023-08-28

## 2023-09-06 ENCOUNTER — OFFICE VISIT (OUTPATIENT)
Dept: FAMILY MEDICINE CLINIC | Age: 63
End: 2023-09-06
Payer: COMMERCIAL

## 2023-09-06 VITALS
WEIGHT: 211 LBS | DIASTOLIC BLOOD PRESSURE: 82 MMHG | OXYGEN SATURATION: 97 % | SYSTOLIC BLOOD PRESSURE: 128 MMHG | BODY MASS INDEX: 33.04 KG/M2 | HEART RATE: 67 BPM | RESPIRATION RATE: 18 BRPM | TEMPERATURE: 97 F

## 2023-09-06 DIAGNOSIS — R73.9 HYPERGLYCEMIA: ICD-10-CM

## 2023-09-06 DIAGNOSIS — I10 HYPERTENSION, UNSPECIFIED TYPE: Primary | ICD-10-CM

## 2023-09-06 DIAGNOSIS — E78.5 HYPERLIPIDEMIA, UNSPECIFIED HYPERLIPIDEMIA TYPE: ICD-10-CM

## 2023-09-06 DIAGNOSIS — M25.561 ARTHRALGIA OF RIGHT KNEE: ICD-10-CM

## 2023-09-06 LAB — HBA1C MFR BLD: 5.4 %

## 2023-09-06 PROCEDURE — G8427 DOCREV CUR MEDS BY ELIG CLIN: HCPCS | Performed by: NURSE PRACTITIONER

## 2023-09-06 PROCEDURE — 1036F TOBACCO NON-USER: CPT | Performed by: NURSE PRACTITIONER

## 2023-09-06 PROCEDURE — 3017F COLORECTAL CA SCREEN DOC REV: CPT | Performed by: NURSE PRACTITIONER

## 2023-09-06 PROCEDURE — 3074F SYST BP LT 130 MM HG: CPT | Performed by: NURSE PRACTITIONER

## 2023-09-06 PROCEDURE — 3079F DIAST BP 80-89 MM HG: CPT | Performed by: NURSE PRACTITIONER

## 2023-09-06 PROCEDURE — 99214 OFFICE O/P EST MOD 30 MIN: CPT | Performed by: NURSE PRACTITIONER

## 2023-09-06 PROCEDURE — G8417 CALC BMI ABV UP PARAM F/U: HCPCS | Performed by: NURSE PRACTITIONER

## 2023-09-06 PROCEDURE — 83036 HEMOGLOBIN GLYCOSYLATED A1C: CPT | Performed by: NURSE PRACTITIONER

## 2023-09-06 NOTE — PROGRESS NOTES
Subjective:    Chief Complaint:     Lindsay Boykin is a 58 y.o. male who presents for a preoperative physical examination. He is scheduled to have left total knee done by Dr. Petra Lr at [facility] on 10/16/23. History of Present Illness:      Progressive knee pain. Past Medical History:   Diagnosis Date    Hearing loss         Review of patient's past surgical history indicates:     Past Surgical History:   Procedure Laterality Date    HERNIA REPAIR      TONSILLECTOMY Bilateral                                                    Current Outpatient Medications   Medication Sig Dispense Refill    lisinopril-hydroCHLOROthiazide (PRINZIDE;ZESTORETIC) 20-25 MG per tablet Take 1 tablet by mouth once daily 90 tablet 1    amLODIPine (NORVASC) 5 MG tablet Take 1 tablet by mouth daily 90 tablet 1    pravastatin (PRAVACHOL) 20 MG tablet Take 1 tablet by mouth daily 30 tablet 5    ketoconazole (NIZORAL) 2 % cream Apply topically daily. 30 g 1     No current facility-administered medications for this visit. Allergies   Allergen Reactions    Penicillins Shortness Of Breath, Swelling and Rash    Statins        Social History     Tobacco Use    Smoking status: Former     Packs/day: 1.00     Years: 10.00     Pack years: 10.00     Types: Cigars, Cigarettes     Quit date:      Years since quittin.6    Smokeless tobacco: Never   Substance Use Topics    Alcohol use:  Yes     Alcohol/week: 12.0 standard drinks     Types: 12 Cans of beer per week    Drug use: No        Family History   Problem Relation Age of Onset    High Blood Pressure Mother     Cancer Brother     Heart Disease Maternal Grandmother     Other Maternal Grandfather         blood clot        Review Of Systems    Skin: no abnormal pigmentation, rash, scaling, itching, masses, hair or nail changes  Eyes: negative  Ears/Nose/Throat: negative  Respiratory: negative  Cardiovascular: negative  Gastrointestinal: negative  Genitourinary: negative  Musculoskeletal:

## 2023-09-18 DIAGNOSIS — E78.5 HYPERLIPIDEMIA, UNSPECIFIED HYPERLIPIDEMIA TYPE: ICD-10-CM

## 2023-09-18 RX ORDER — PRAVASTATIN SODIUM 20 MG
20 TABLET ORAL DAILY
Qty: 30 TABLET | Refills: 11 | Status: SHIPPED | OUTPATIENT
Start: 2023-09-18

## 2024-01-04 DIAGNOSIS — I10 HYPERTENSION, UNSPECIFIED TYPE: ICD-10-CM

## 2024-01-04 RX ORDER — AMLODIPINE BESYLATE 5 MG/1
5 TABLET ORAL DAILY
Qty: 90 TABLET | Refills: 3 | Status: SHIPPED | OUTPATIENT
Start: 2024-01-04

## 2024-01-04 NOTE — TELEPHONE ENCOUNTER
Last visit- 9/6/2023  Next visit- Visit date not found    Requested Prescriptions     Pending Prescriptions Disp Refills    amLODIPine (NORVASC) 5 MG tablet [Pharmacy Med Name: amLODIPine Besylate 5 MG Oral Tablet] 90 tablet 0     Sig: Take 1 tablet by mouth once daily

## 2024-03-05 DIAGNOSIS — I10 HYPERTENSION, UNSPECIFIED TYPE: ICD-10-CM

## 2024-03-05 RX ORDER — LISINOPRIL AND HYDROCHLOROTHIAZIDE 25; 20 MG/1; MG/1
TABLET ORAL
Qty: 90 TABLET | Refills: 3 | Status: SHIPPED | OUTPATIENT
Start: 2024-03-05

## 2024-09-18 DIAGNOSIS — E78.5 HYPERLIPIDEMIA, UNSPECIFIED HYPERLIPIDEMIA TYPE: ICD-10-CM

## 2024-09-18 RX ORDER — PRAVASTATIN SODIUM 20 MG
20 TABLET ORAL DAILY
Qty: 90 TABLET | Refills: 1 | Status: SHIPPED | OUTPATIENT
Start: 2024-09-18

## 2024-12-30 DIAGNOSIS — I10 HYPERTENSION, UNSPECIFIED TYPE: ICD-10-CM

## 2024-12-30 RX ORDER — AMLODIPINE BESYLATE 5 MG/1
5 TABLET ORAL DAILY
Qty: 90 TABLET | Refills: 0 | Status: SHIPPED | OUTPATIENT
Start: 2024-12-30

## 2025-01-13 ENCOUNTER — OFFICE VISIT (OUTPATIENT)
Dept: FAMILY MEDICINE CLINIC | Age: 65
End: 2025-01-13
Payer: COMMERCIAL

## 2025-01-13 VITALS
BODY MASS INDEX: 33.9 KG/M2 | HEART RATE: 77 BPM | TEMPERATURE: 98.4 F | HEIGHT: 67 IN | RESPIRATION RATE: 16 BRPM | OXYGEN SATURATION: 96 % | WEIGHT: 216 LBS | DIASTOLIC BLOOD PRESSURE: 74 MMHG | SYSTOLIC BLOOD PRESSURE: 136 MMHG

## 2025-01-13 DIAGNOSIS — I10 HYPERTENSION, UNSPECIFIED TYPE: ICD-10-CM

## 2025-01-13 DIAGNOSIS — Z00.00 ROUTINE PHYSICAL EXAMINATION: Primary | ICD-10-CM

## 2025-01-13 DIAGNOSIS — Z12.5 SCREENING FOR PROSTATE CANCER: ICD-10-CM

## 2025-01-13 DIAGNOSIS — E78.5 HYPERLIPIDEMIA, UNSPECIFIED HYPERLIPIDEMIA TYPE: ICD-10-CM

## 2025-01-13 PROCEDURE — 3078F DIAST BP <80 MM HG: CPT | Performed by: NURSE PRACTITIONER

## 2025-01-13 PROCEDURE — 99396 PREV VISIT EST AGE 40-64: CPT | Performed by: NURSE PRACTITIONER

## 2025-01-13 PROCEDURE — 3075F SYST BP GE 130 - 139MM HG: CPT | Performed by: NURSE PRACTITIONER

## 2025-01-13 RX ORDER — PRAVASTATIN SODIUM 20 MG
20 TABLET ORAL DAILY
Qty: 90 TABLET | Refills: 3 | Status: SHIPPED | OUTPATIENT
Start: 2025-01-13

## 2025-01-13 RX ORDER — LISINOPRIL AND HYDROCHLOROTHIAZIDE 20; 25 MG/1; MG/1
1 TABLET ORAL DAILY
Qty: 90 TABLET | Refills: 3 | Status: SHIPPED | OUTPATIENT
Start: 2025-01-13

## 2025-01-13 RX ORDER — AMLODIPINE BESYLATE 5 MG/1
5 TABLET ORAL DAILY
Qty: 90 TABLET | Refills: 3 | Status: SHIPPED | OUTPATIENT
Start: 2025-01-13

## 2025-01-13 SDOH — ECONOMIC STABILITY: FOOD INSECURITY: WITHIN THE PAST 12 MONTHS, THE FOOD YOU BOUGHT JUST DIDN'T LAST AND YOU DIDN'T HAVE MONEY TO GET MORE.: NEVER TRUE

## 2025-01-13 SDOH — ECONOMIC STABILITY: FOOD INSECURITY: WITHIN THE PAST 12 MONTHS, YOU WORRIED THAT YOUR FOOD WOULD RUN OUT BEFORE YOU GOT MONEY TO BUY MORE.: NEVER TRUE

## 2025-01-13 ASSESSMENT — PATIENT HEALTH QUESTIONNAIRE - PHQ9
1. LITTLE INTEREST OR PLEASURE IN DOING THINGS: NOT AT ALL
2. FEELING DOWN, DEPRESSED OR HOPELESS: NOT AT ALL
SUM OF ALL RESPONSES TO PHQ QUESTIONS 1-9: 0
SUM OF ALL RESPONSES TO PHQ9 QUESTIONS 1 & 2: 0
SUM OF ALL RESPONSES TO PHQ QUESTIONS 1-9: 0

## 2025-01-13 NOTE — PROGRESS NOTES
rhinorrhea, sore throat  Respiratory: no cough, pleuritic chest pain, dyspnea, or wheezing  Cardiovascular: no angina, BORRERO, orthopnea, PND, palpitations, or claudication  Gastrointestinal: no nausea, vomiting, heartburn, diarrhea, constipation, bloating, or abdominal pain  Genitourinary: no urinary urgency, frequency, dysuria, nocturia, hesitancy, or incontinence  Musculoskeletal: no arthritis, arthralgia, myalgia, weakness, or morning stiffness  Neurologic: no paralysis, paresis, paresthesia, seizures, tremors, or headaches  Hematologic/Lymphatic/Immunologic: no anemia, abnormal bleeding/bruising, fever, chills, night sweats, swollen glands, or unexplained weight loss  Endocrine: no heat or cold intolerance and no polyphagia, polydipsia, or polyuria    PHYSICAL EXAMINATION:  /74 (Site: Left Upper Arm, Position: Sitting, Cuff Size: Large Adult)   Pulse 77   Temp 98.4 °F (36.9 °C) (Temporal)   Resp 16   Ht 1.702 m (5' 7.01\")   Wt 98 kg (216 lb)   SpO2 96%   BMI 33.82 kg/m²   General appearance: healthy, alert, no distress  Skin: Skin color, texture, turgor normal. No rashes or lesions.  No induration or tightening palpated.  Head: Normocephalic. No masses, lesions, tenderness or abnormalities  Eyes: conjunctivae/corneas clear. PERRL, EOM's intact. Fundi are normal, no papilledema, hemorrhages or exudates. No AV crossing changes are noted.  Ears: External ears normal. Canals clear. TM's clear bilaterally.  Hearing normal to finger rub.  Nose/Sinuses: Nares normal. Septum midline. Mucosa normal. No drainage or sinus tenderness.  Oropharynx: Lips, mucosa, and tongue normal. Teeth and gums normal. Oropharynx clear with no exudate seen.  Neck: Neck supple, and symmetric. No adenopathy. Thyroid symmetric, normal size, without nodule.  Trachea is midline.  Back: Back symmetric, no curvature. ROM normal. No CVA tenderness.  Lungs: Good diaphragmatic excursion. Lungs clear to auscultation bilaterally.  No

## 2025-02-17 ENCOUNTER — LAB (OUTPATIENT)
Dept: FAMILY MEDICINE CLINIC | Age: 65
End: 2025-02-17
Payer: COMMERCIAL

## 2025-02-17 DIAGNOSIS — Z00.00 ROUTINE PHYSICAL EXAMINATION: ICD-10-CM

## 2025-02-17 DIAGNOSIS — Z12.5 SCREENING FOR PROSTATE CANCER: ICD-10-CM

## 2025-02-17 LAB
ALBUMIN SERPL BCG-MCNC: 4.5 G/DL (ref 3.5–5.1)
ALP SERPL-CCNC: 65 U/L (ref 38–126)
ALT SERPL W/O P-5'-P-CCNC: 21 U/L (ref 11–66)
ANION GAP SERPL CALC-SCNC: 17 MEQ/L (ref 8–16)
AST SERPL-CCNC: 21 U/L (ref 5–40)
BASOPHILS ABSOLUTE: 0 THOU/MM3 (ref 0–0.1)
BASOPHILS NFR BLD AUTO: 0.5 %
BILIRUB SERPL-MCNC: 0.5 MG/DL (ref 0.3–1.2)
BUN SERPL-MCNC: 17 MG/DL (ref 7–22)
CALCIUM SERPL-MCNC: 9.2 MG/DL (ref 8.5–10.5)
CHLORIDE SERPL-SCNC: 99 MEQ/L (ref 98–111)
CHOLEST SERPL-MCNC: 227 MG/DL (ref 100–199)
CO2 SERPL-SCNC: 25 MEQ/L (ref 23–33)
CREAT SERPL-MCNC: 0.7 MG/DL (ref 0.4–1.2)
DEPRECATED RDW RBC AUTO: 47.4 FL (ref 35–45)
EOSINOPHIL NFR BLD AUTO: 3.1 %
EOSINOPHILS ABSOLUTE: 0.2 THOU/MM3 (ref 0–0.4)
ERYTHROCYTE [DISTWIDTH] IN BLOOD BY AUTOMATED COUNT: 13.1 % (ref 11.5–14.5)
GFR SERPL CREATININE-BSD FRML MDRD: > 90 ML/MIN/1.73M2
GLUCOSE SERPL-MCNC: 107 MG/DL (ref 70–108)
HCT VFR BLD AUTO: 46.9 % (ref 42–52)
HDLC SERPL-MCNC: 57 MG/DL
HGB BLD-MCNC: 15.6 GM/DL (ref 14–18)
IMM GRANULOCYTES # BLD AUTO: 0.01 THOU/MM3 (ref 0–0.07)
IMM GRANULOCYTES NFR BLD AUTO: 0.2 %
LDLC SERPL CALC-MCNC: 149 MG/DL
LYMPHOCYTES ABSOLUTE: 1.9 THOU/MM3 (ref 1–4.8)
LYMPHOCYTES NFR BLD AUTO: 32.8 %
MCH RBC QN AUTO: 32.8 PG (ref 26–33)
MCHC RBC AUTO-ENTMCNC: 33.3 GM/DL (ref 32.2–35.5)
MCV RBC AUTO: 98.7 FL (ref 80–94)
MONOCYTES ABSOLUTE: 0.6 THOU/MM3 (ref 0.4–1.3)
MONOCYTES NFR BLD AUTO: 11.3 %
NEUTROPHILS ABSOLUTE: 3 THOU/MM3 (ref 1.8–7.7)
NEUTROPHILS NFR BLD AUTO: 52.1 %
NRBC BLD AUTO-RTO: 0 /100 WBC
PLATELET # BLD AUTO: 202 THOU/MM3 (ref 130–400)
PMV BLD AUTO: 11.2 FL (ref 9.4–12.4)
POTASSIUM SERPL-SCNC: 4.2 MEQ/L (ref 3.5–5.2)
PROT SERPL-MCNC: 7.2 G/DL (ref 6.1–8)
PSA SERPL-MCNC: 0.35 NG/ML (ref 0–1)
RBC # BLD AUTO: 4.75 MILL/MM3 (ref 4.7–6.1)
SODIUM SERPL-SCNC: 141 MEQ/L (ref 135–145)
TRIGL SERPL-MCNC: 104 MG/DL (ref 0–199)
WBC # BLD AUTO: 5.7 THOU/MM3 (ref 4.8–10.8)

## 2025-02-17 PROCEDURE — 36415 COLL VENOUS BLD VENIPUNCTURE: CPT | Performed by: NURSE PRACTITIONER

## 2025-02-18 ENCOUNTER — TELEPHONE (OUTPATIENT)
Dept: FAMILY MEDICINE CLINIC | Age: 65
End: 2025-02-18

## 2025-02-18 DIAGNOSIS — E78.5 HYPERLIPIDEMIA, UNSPECIFIED HYPERLIPIDEMIA TYPE: ICD-10-CM

## 2025-02-18 RX ORDER — PRAVASTATIN SODIUM 40 MG
40 TABLET ORAL DAILY
Qty: 30 TABLET | Refills: 5 | Status: SHIPPED | OUTPATIENT
Start: 2025-02-18

## 2025-02-18 NOTE — TELEPHONE ENCOUNTER
Please call pt.  His cholesterol is higher than we would like.  I called in 40mg of pravachol.  Let me know if he is unable to tolerate

## 2025-03-17 DIAGNOSIS — E78.5 HYPERLIPIDEMIA, UNSPECIFIED HYPERLIPIDEMIA TYPE: ICD-10-CM

## 2025-03-17 RX ORDER — PRAVASTATIN SODIUM 20 MG
20 TABLET ORAL DAILY
Qty: 90 TABLET | Refills: 1 | Status: SHIPPED | OUTPATIENT
Start: 2025-03-17

## 2025-04-30 NOTE — PROGRESS NOTES
The patient location is: [facility]  The chief complaint leading to consultation is: Colon cancer    Visit type: audiovisual    Face to Face time with patient: 40 minutes  55 minutes of total time spent on the encounter, which includes face to face time and non-face to face time preparing to see the patient (eg, review of tests), Obtaining and/or reviewing separately obtained history, Documenting clinical information in the electronic or other health record, Independently interpreting results (not separately reported) and communicating results to the patient/family/caregiver, or Care coordination (not separately reported).     Each patient to whom he or she provides medical services by telemedicine is:  (1) informed of the relationship between the physician and patient and the respective role of any other health care provider with respect to management of the patient; and (2) notified that he or she may decline to receive medical services by telemedicine and may withdraw from such care at any time.    Notes:     GI MEDICAL ONCOLOGY    Reason for visit: Colon cancer    Best Contact Phone Number(s): There are no phone numbers on file.     Cancer/Stage/TNM:    Cancer Staging   Malignant neoplasm of ascending colon  Staging form: Colon and Rectum, AJCC 8th Edition  - Clinical: Stage BIBIANA (cT3, cN1a, cM1a) - Signed by Amarjit Crawford MD on 12/18/2024       Oncology History   Malignant neoplasm of ascending colon   12/12/2024 Initial Diagnosis    Malignant neoplasm of ascending colon     12/18/2024 Cancer Staged    Staging form: Colon and Rectum, AJCC 8th Edition  - Clinical: Stage BIBIANA (cT3, cN1a, cM1a)     2/4/2025 -  Chemotherapy    Treatment Summary   Plan Name: OP GI mFOLFOX6 (oxaliplatin leucovorin fluorouracil) Q2W  Treatment Goal: Control  Status: Active  Start Date: 2/4/2025  End Date: 7/11/2025 (Planned)  Provider: Amarjit Crawford MD  Chemotherapy: fluorouraciL injection 830 mg, 400 mg/m2 = 830 mg, Intravenous,  Venipuncture obtained from  right arm. Patient tolerated the procedure without complications or complaints.   Clinic/HOD 1 time, 7 of 12 cycles  Administration: 830 mg (2/4/2025), 830 mg (2/18/2025), 830 mg (3/5/2025), 830 mg (3/19/2025), 830 mg (4/2/2025), 830 mg (4/15/2025), 830 mg (4/29/2025)  oxaliplatin (ELOXATIN) 85 mg/m2 = 177 mg in D5W 600.4 mL chemo infusion, 85 mg/m2 = 177 mg, Intravenous, Clinic/HOD 1 time, 7 of 12 cycles  Administration: 177 mg (2/4/2025), 177 mg (2/18/2025), 177 mg (3/5/2025), 177 mg (3/19/2025), 177 mg (4/2/2025), 177 mg (4/15/2025), 177 mg (4/29/2025)  fluorouracil (Adrucil) 2,400 mg/m2 = 4,990 mg in 0.9% NaCl 250 mL chemo infusion, 2,400 mg/m2 = 4,990 mg, Intravenous, Over 46 hours, 7 of 12 cycles  Administration: 4,990 mg (2/4/2025), 4,990 mg (2/18/2025), 4,990 mg (3/5/2025), 4,990 mg (3/19/2025), 4,990 mg (4/2/2025), 4,990 mg (4/15/2025), 4,990 mg (4/29/2025)          HPI:   59 y.o. female with HTN, DM who presents for further discussion of her metastatic colon cancer.    History of Present Illness    INTERVAL HISTORY:  Patient has metastatic colon cancer to the liver. At diagnosis, she had bleeding from the cecal tumor necessitating upfront right colectomy with Dr. Juarez on 1/7/25. Pathology revealed two separate foci of moderately differentiated adenocarcinoma with 3/16 positive lymph nodes. She presented with synchronous, biopsy-proven liver metastases, with the biopsied lesion in segment 8 of the liver and at least 5 other small suspicious lesions at baseline. She underwent ablation of the biopsied lesion but developed new hepatic metastases.    She has received 7 cycles of FOLFOX, with the 7th cycle administered yesterday (4/29). MRI on 04/28/2025 shows stable to very mildly progressive disease in the liver without evidence of extrahepatic disease on CT.    She reports significant fatigue and neuropathy secondary to chemotherapy, with neuropathy most significant immediately post-chemotherapy and partial improvement between cycles but without complete resolution. Currently, she is on  gabapentin for back pain and duloxetine, the latter of which was initiated 1 month ago for neuropathy. She is uncertain about medication efficacy as neuropathy persists.    She denies pain or other cancer-related symptoms beyond chemotherapy side effects.            Patient presents alone.  ECOG PS is 0.  History has been obtained by chart review and discussion with the patient.    ROS:   Review of Systems   Constitutional:  Positive for malaise/fatigue.   Gastrointestinal:  Negative for abdominal pain, constipation, diarrhea, nausea and vomiting.   Neurological:  Positive for tingling.       Past Medical History:   Past Medical History:   Diagnosis Date    Colon cancer     Diabetes mellitus     Diabetes mellitus, type 2     Encounter for blood transfusion     Herpes     Hypertension     Sleep apnea         Past Surgical History:   Past Surgical History:   Procedure Laterality Date    COLONOSCOPY N/A 11/8/2024    Procedure: COLONOSCOPY;  Surgeon: Saw Wick MD;  Location: [facility];  Service: Endoscopy;  Laterality: N/A;    ESOPHAGOGASTRODUODENOSCOPY N/A 11/8/2024    Procedure: EGD (ESOPHAGOGASTRODUODENOSCOPY);  Surgeon: Saw Wick MD;  Location: [facility];  Service: Endoscopy;  Laterality: N/A;    EYE SURGERY  2002    lasix Bilateral    HYSTERECTOMY  2012    INSERTION OF TUNNELED CENTRAL VENOUS CATHETER (CVC) WITH SUBCUTANEOUS PORT Right 12/20/2024    Procedure: INSERTION, PORT-A-CATH;  Surgeon: Logan Juarez MD;  Location: [facility];  Service: General;  Laterality: Right;    LUMBAR DISCECTOMY      ROBOT-ASSISTED COLECTOMY Right 1/7/2025    Procedure: ROBOTIC COLECTOMY WITH ILEOCOLIC ANASTOMOSIS;  Surgeon: Logan Juarez MD;  Location: [facility];  Service: General;  Laterality: Right;    WISDOM TOOTH EXTRACTION          Family History:   Family History   Problem Relation Name Age of Onset    Heart disease Mother      Hypertension Mother      Cataracts Mother      Pancreatic cancer Father       Cancer Father      Arthritis Sister 2     Diabetes Brother 1     No Known Problems Paternal Aunt 1     Heart disease Paternal Uncle 2     Heart disease Maternal Grandfather      Alzheimer's disease Paternal Grandmother      Glaucoma Neg Hx          Social History:   Social History     Tobacco Use    Smoking status: Never    Smokeless tobacco: Never   Substance Use Topics    Alcohol use: No        I have reviewed and updated the patient's past medical, surgical, family and social histories.    Allergies:   Review of patient's allergies indicates:   Allergen Reactions    Robaxin [methocarbamol] Nausea Only        Medications:   Current Medications[1]     Physical Exam:   There were no vitals taken for this visit.               Physical Exam  Constitutional:       General: She is not in acute distress.     Appearance: Normal appearance. She is not ill-appearing.      Comments: Exam limited by virtual nature   HENT:      Head: Normocephalic and atraumatic.   Eyes:      General: No scleral icterus.     Extraocular Movements: Extraocular movements intact.      Conjunctiva/sclera: Conjunctivae normal.   Pulmonary:      Effort: Pulmonary effort is normal. No respiratory distress.   Neurological:      Mental Status: She is alert and oriented to person, place, and time.   Psychiatric:         Mood and Affect: Mood normal.         Behavior: Behavior normal.         Thought Content: Thought content normal.         Judgment: Judgment normal.           Labs:   No results found for this or any previous visit (from the past 48 hours).     Imaging:       MRI - 4/28/25:  Impression:     Findings consistent with changes of ablation with further decrease of the ablation cavity and no suspicious nodular enhancement.  Innumerable small subcentimeter suggesting metastatic disease some appearing more apparent but not thought overall significantly changed compared to the prior study MRI 02/28/2025.    Path:   COLON:     PROCEDURE: Right  hemicolectomy   MACROSCOPIC EVALUATION OF MESORECTUM: Not applicable   TUMOR SITE: Cecum   RECTAL TUMOR LOCATION: Not applicable   HISTOLOGIC TYPE: Adenocarcinoma   HISTOLOGIC GRADE: Moderately differentiated   TUMOR SIZE: 48 mm and 4 mm   MULTIPLE PRIMARY SITES: Two separate foci, both in cecum/ascending colon    region   TUMOR EXTENT: Tumor invades kristopher-intestinal adipose tissue   SUB-MUCOSAL INVASION: Not applicable   MACROSCOPIC TUMOR PERFORATION: Negative   LYMPHATIC AND/OR VASCULAR INVASION: Positive   PERINEURAL INVASION: Negative   TUMOR BUDDING SCORE: Moderate   TREATMENT EFFECT: Not applicable   MARGIN STATUS FOR INVASIVE CARCINOMA: Negative   DISTANCE OF INVASIVE CARCINOMA TO RADIAL MARGIN: 35 mm   DISTANCE OF INVASIVE CARCINOMA TO CLOSEST MARGIN: 35 mm to radial margin   MARGINS INVOLVED BY INVASIVE CARCINOMA: Not applicable   MARGIN STATUS FOR NON-INVASIVE TUMOR: Negative   DNA MISMATCH REPAIR STATUS (MMR): Previously performed ([id number]). No    loss of nuclear expression of MMR proteins: Low probability of MSI-H.   REGIONAL LYMPH NODE STATUS:   NUMBER OF LYMPH NODES WITH TUMOR: 3   NUMBER OF LYMPH NODES EXAMINED: 16   TUMOR DEPOSITS: Negative   DISTANT SITES INVOLVED: Liver ([facility]; [id number]).   PATHOLOGIC STAGE CLASSIFICATION: pT3 pN1b pM1a       Assessment:       1. Malignant neoplasm of ascending colon    2. Metastasis to liver    3. Chemotherapy-induced neuropathy    4. Diabetes mellitus due to underlying condition with hyperosmolarity without coma, unspecified whether long term insulin use          Plan:             # Cecal adenocarcinoma  Diagnosed on colonoscopy 11/8/24.  Had synchronous liver metastases, small but diffuse and bilobar.  Biopsy proven, MWA to segment 8 metastasis done 1/29/25.  Underwent upfront R hemicolectomy 1/7/25 due to recurrent bleeding with Dr. Juarez.  Path showed pT3 N1b M1a disease, pMMR.  Started on FOLFOX on 2/4/25 with Dr. Crawford.  Repeat imaging after cycle 6  shows stable unresectable bilobar small liver metastases.  No clear evidence of extrahepatic disease.  She received cycle 7 of FOLFOX on 4/29/25.    Discussed options of continuing FOLFOX with addition of bevacizumab, understanding she would have to come off oxaliplatin shortly due to neuropathy and proceed with maintenance chemovs switching to FOLFIRI + antoine second line vs BOB pump.  Also could consider future liver transplantation.  Will discuss her at [facility] tumor board.    After extensive discussion of options and potential risks/toxicities of BOB, she wants to proceed with BOB pump.  Will discuss timing with Ramy Henry and Yvette.  Will recommend she come off oxaliplatin and just proceed with maintenance 5-FU until 3 or so weeks pre-BOB implantation.  Will start her on floxuridine 14 days post-op and then 5-FU maintenance 28 days post-op.  Can always add irinotecan in future to 5-FU if there are signs of intrahepatic or extrahepatic progression.    She is already on PPI.    Tempus xT: INDIA, AMER1, APC, KRAS G12V, SMAD3, TMB  10.5  PGX: DPYD nml, UGT1A1 nml    # CIPN  Will recommend she hold further oxaliplatin.  Continue Cymbalta, gabapentin    # T2DM  Continue medical therapy.    Follow up: pending surgical plan.     The above information has been reviewed with the patient and all questions have been answered to their apparent satisfaction.  They understand that they can call the clinic with any questions.     code applied: patient requires or will require a continuous, longitudinal, and active collaborative plan of care related to this patient's health condition, colon cancer --the management of which requires the direction of a practitioner with specialized clinical knowledge, skill, and expertise.       Thom Stanford MD  Hematology/Oncology  [facility]    Route Chart for Scheduling    Med Onc Chart Routing      Follow up with physician . TBD   Follow up with [facility]    Infusion  scheduling note    Injection scheduling note    Labs    Imaging    Pharmacy appointment    Other referrals                  Treatment Plan Information   OP GI mFOLFOX6 (oxaliplatin leucovorin fluorouracil) Q2W Amarjit Crawford MD   Associated diagnosis: Malignant neoplasm of ascending colon Stage BIBIANA cT3, cN1a, cM1a noted on 12/12/2024   Line of treatment: Neoadjuvant  Treatment Goal: Control     Upcoming Treatment Dates - OP GI mFOLFOX6 (oxaliplatin leucovorin fluorouracil) Q2W    5/14/2025       Chemotherapy       oxaliplatin (ELOXATIN) 85 mg/m2 = 177 mg in D5W 535.4 mL chemo infusion       leucovorin calcium 400 mg/m2 = 830 mg in D5W 250 mL infusion       fluorouraciL injection 830 mg       fluorouracil (Adrucil) 2,400 mg/m2 = 4,990 mg in 0.9% NaCl 100 mL chemo infusion       Antiemetics       palonosetron (ALOXI) 0.25 mg with Dexamethasone (DECADRON) 12 mg in NS 50 mL IVPB  5/28/2025       Chemotherapy       oxaliplatin (ELOXATIN) 85 mg/m2 = 177 mg in D5W 535.4 mL chemo infusion       leucovorin calcium 400 mg/m2 = 830 mg in D5W 250 mL infusion       fluorouraciL injection 830 mg       fluorouracil (Adrucil) 2,400 mg/m2 = 4,990 mg in 0.9% NaCl 100 mL chemo infusion       Antiemetics       palonosetron (ALOXI) 0.25 mg with Dexamethasone (DECADRON) 12 mg in NS 50 mL IVPB  6/11/2025       Chemotherapy       oxaliplatin (ELOXATIN) 85 mg/m2 = 177 mg in D5W 535.4 mL chemo infusion       leucovorin calcium 400 mg/m2 = 830 mg in D5W 250 mL infusion       fluorouraciL injection 830 mg       fluorouracil (Adrucil) 2,400 mg/m2 = 4,990 mg in 0.9% NaCl 100 mL chemo infusion       Antiemetics       palonosetron (ALOXI) 0.25 mg with Dexamethasone (DECADRON) 12 mg in NS 50 mL IVPB  6/25/2025       Chemotherapy       oxaliplatin (ELOXATIN) 85 mg/m2 = 177 mg in D5W 535.4 mL chemo infusion       leucovorin calcium 400 mg/m2 = 830 mg in D5W 250 mL infusion       fluorouraciL injection 830 mg       fluorouracil (Adrucil) 2,400 mg/m2 =  4,990 mg in 0.9% NaCl 100 mL chemo infusion       Antiemetics       palonosetron (ALOXI) 0.25 mg with Dexamethasone (DECADRON) 12 mg in NS 50 mL IVPB    Therapy Plan Information  FERRLECIT (FERRIC GLUCONATE) QW for Iron deficiency anemia, noted on 11/7/2024  Anaphylaxis/Hypersensitivity  EPINEPHrine (EPIPEN) 0.3 mg/0.3 mL pen injection 0.3 mg  0.3 mg, Intramuscular, PRN  diphenhydrAMINE injection 50 mg  50 mg, Intravenous, PRN  hydrocortisone sodium succinate injection 100 mg  100 mg, Intravenous, PRN  Flushes  heparin, porcine (PF) 100 unit/mL injection flush 500 Units  500 Units, Intravenous, PRN  sodium chloride 0.9% flush 10 mL  10 mL, Intravenous, 1 time a week  0.9% NaCl 100 mL flush bag  Intravenous, 1 time a week    PORT FLUSH for Colon adenocarcinoma, noted on 12/20/2024  PORT FLUSH for Malignant neoplasm of ascending colon, noted on 12/12/2024  Flushes  heparin, porcine (PF) 100 unit/mL injection flush 500 Units  500 Units, Intravenous, Every visit  sodium chloride 0.9% flush 10 mL  10 mL, Intravenous, Every visit      No therapy plan of the specified type found.       [1]   Current Outpatient Medications   Medication Sig Dispense Refill    amoxicillin-clavulanate 875-125mg (AUGMENTIN) 875-125 mg per tablet Take 1 tablet by mouth 2 (two) times daily. 14 tablet 0    atorvastatin (LIPITOR) 20 MG tablet TAKE 1 TABLET BY MOUTH EVERY DAY 90 tablet 3    blood-glucose meter kit To check BG 3 times daily, to use with insurance preferred meter 1 each 0    dexAMETHasone (DECADRON) 4 MG Tab Take 2 tablets (8 mg total) by mouth once daily. On days 2 through 4 of each cycle of chemo 120 tablet 0    docusate sodium (COLACE) 100 MG capsule Take 1 capsule (100 mg total) by mouth 2 (two) times daily. 14 capsule 0    DULoxetine (CYMBALTA) 20 MG capsule Take 2 capsules (40 mg total) by mouth once daily. 60 capsule 11    fexofenadine (ALLEGRA) 180 MG tablet Take 180 mg by mouth nightly.      fluticasone propionate (FLONASE)  "50 mcg/actuation nasal spray SPRAY 2 SPRAYS BY EACH NOSTRIL ROUTE ONCE DAILY. 48 mL 3    FREESTYLE JOCELIN 14 DAY SENSOR Kit 1 APPLICATION BY MISC.(NON-DRUG COMBO ROUTE) ROUTE DAILY AS NEEDED. 1 kit 24    gabapentin (NEURONTIN) 800 MG tablet Take 800 mg by mouth 3 (three) times daily.      guaiFENesin (MUCINEX) 1,200 mg Ta12 Take 1 tablet by mouth nightly.      HYDROcodone-acetaminophen (NORCO) 5-325 mg per tablet Take 1 tablet by mouth every 6 (six) hours as needed for Pain. 20 tablet 0    insulin glargine, TOUJEO, (TOUJEO SOLOSTAR U-300 INSULIN) 300 unit/mL (1.5 mL) InPn pen Inject 25 Units into the skin once daily. 2.5 mL 11    lamoTRIgine (LAMICTAL) 150 MG Tab TAKE 1 TABLET BY MOUTH EVERY DAY 90 tablet 3    lancets Misc To check BG 3 times daily, to use with insurance preferred meter 200 each 1    LIDOcaine-prilocaine (EMLA) cream Apply topically as needed (Apply to port site 30 minutes before access as needed). 30 g 0    lisinopriL (PRINIVIL,ZESTRIL) 20 MG tablet TAKE 1 TABLET BY MOUTH EVERY DAY 90 tablet 3    magnesium oxide (MAG-OX) 400 mg (241.3 mg magnesium) tablet Take 1 tablet (400 mg total) by mouth 2 (two) times daily. 60 tablet 3    metFORMIN (GLUCOPHAGE-XR) 500 MG ER 24hr tablet TAKE 2 TABLETS BY MOUTH TWICE A DAY WITH MEALS 360 tablet 3    multivitamin (THERAGRAN) per tablet Take 1 tablet by mouth once daily.      omeprazole (PRILOSEC) 20 MG capsule Take 20 mg by mouth every evening.      ondansetron (ZOFRAN) 8 MG tablet Take 1 tablet (8 mg total) by mouth every 8 (eight) hours as needed. 30 tablet 2    ONETOUCH ULTRA BLUE TEST STRIP Strp USE TO CHECK BLOOD SUGAR 3 TIMES A  strip 0    pen needle, diabetic (PEN NEEDLE) 31 gauge x 5/16" Ndle 1 Application by Misc.(Non-Drug; Combo Route) route once daily. 30 each 5    prochlorperazine (COMPAZINE) 10 MG tablet Take 1 tablet (10 mg total) by mouth every 6 (six) hours as needed. 30 tablet 1    sertraline (ZOLOFT) 100 MG tablet TAKE 1 TABLET BY MOUTH " EVERY DAY 90 tablet 3    valACYclovir (VALTREX) 500 MG tablet TAKE 1 TABLET BY MOUTH TWICE A  tablet 3     Current Facility-Administered Medications   Medication Dose Route Frequency Provider Last Rate Last Admin    diphenhydrAMINE capsule 25 mg  25 mg Oral PRN Amarjit Crawford MD        sodium chloride 0.9% flush 10 mL  10 mL Intravenous PRAmarjit Light MD